# Patient Record
Sex: FEMALE | Race: WHITE | NOT HISPANIC OR LATINO | URBAN - NONMETROPOLITAN AREA
[De-identification: names, ages, dates, MRNs, and addresses within clinical notes are randomized per-mention and may not be internally consistent; named-entity substitution may affect disease eponyms.]

---

## 2019-05-08 ENCOUNTER — IMPORTED ENCOUNTER (OUTPATIENT)
Dept: URBAN - NONMETROPOLITAN AREA CLINIC 1 | Facility: CLINIC | Age: 64
End: 2019-05-08

## 2019-05-08 PROBLEM — H52.11: Noted: 2019-05-08

## 2019-05-08 PROBLEM — Z96.1: Noted: 2019-05-08

## 2019-05-08 PROBLEM — H04.123: Noted: 2019-05-08

## 2019-05-08 PROBLEM — Z01.01: Noted: 2019-05-08

## 2019-05-08 PROBLEM — H26.491: Noted: 2019-05-08

## 2019-05-08 PROCEDURE — 92014 COMPRE OPH EXAM EST PT 1/>: CPT

## 2019-05-08 PROCEDURE — 92015 DETERMINE REFRACTIVE STATE: CPT

## 2019-05-08 NOTE — PATIENT DISCUSSION
Myopia Presbyopia-Discussed diagnosis with patient. Updated spec Rx given. PC IOL OU w/  PCO OD-Explained symptoms of advancing PCO. -Continue to monitor for now. Pt will notify us if any new symptoms develop. RTC 1 Yr CEE; 's Notes: Dr. Bella Najjar

## 2020-06-18 ENCOUNTER — IMPORTED ENCOUNTER (OUTPATIENT)
Dept: URBAN - NONMETROPOLITAN AREA CLINIC 1 | Facility: CLINIC | Age: 65
End: 2020-06-18

## 2020-06-18 PROBLEM — H04.123: Noted: 2019-05-08

## 2020-06-18 PROBLEM — H26.491: Noted: 2019-05-08

## 2020-06-18 PROBLEM — Z96.1: Noted: 2019-05-08

## 2020-06-18 PROBLEM — H52.11: Noted: 2020-06-18

## 2020-06-18 PROCEDURE — 92015 DETERMINE REFRACTIVE STATE: CPT

## 2020-06-18 PROCEDURE — 92014 COMPRE OPH EXAM EST PT 1/>: CPT

## 2020-06-18 NOTE — PATIENT DISCUSSION
Myopia Presbyopia-Discussed diagnosis with patient. Updated spec Rx given. PC IOL OU w/  PCO OD-Explained symptoms of advancing PCO. -Continue to monitor for now. Pt will notify us if any new symptoms develop. RTC 1 Yr CEE; Dr's Notes: Dr. Damian Issa

## 2021-06-28 ENCOUNTER — IMPORTED ENCOUNTER (OUTPATIENT)
Dept: URBAN - NONMETROPOLITAN AREA CLINIC 1 | Facility: CLINIC | Age: 66
End: 2021-06-28

## 2021-06-28 PROBLEM — H26.491: Noted: 2021-06-28

## 2021-06-28 PROBLEM — H52.11: Noted: 2021-06-28

## 2021-06-28 PROBLEM — H52.4: Noted: 2021-06-28

## 2021-06-28 PROBLEM — Z96.1: Noted: 2021-06-28

## 2021-06-28 PROCEDURE — 92014 COMPRE OPH EXAM EST PT 1/>: CPT

## 2021-06-28 PROCEDURE — 92015 DETERMINE REFRACTIVE STATE: CPT

## 2021-06-28 NOTE — PATIENT DISCUSSION
Myopia Presbyopia-Discussed diagnosis with patient. Updated spec Rx given. PC IOL OU w/  PCO OD-Explained symptoms of advancing PCO. -Continue to monitor for now. Pt will notify us if any new symptoms develop. RTC 1 Yr CEE; Dr's Notes: Dr. Juan Luis Causey

## 2022-04-09 ASSESSMENT — TONOMETRY
OD_IOP_MMHG: 21
OS_IOP_MMHG: 16
OS_IOP_MMHG: 17
OD_IOP_MMHG: 16
OD_IOP_MMHG: 16
OS_IOP_MMHG: 18

## 2022-04-09 ASSESSMENT — VISUAL ACUITY
OD_PH: 20/30
OS_SC: 20/20
OS_CC: 20/20 DV
OS_CC: 20/20
OU_SC: 20/20
OD_CC: 20/30
OD_SC: 20/50

## 2022-06-29 ENCOUNTER — ESTABLISHED PATIENT (OUTPATIENT)
Dept: RURAL CLINIC 2 | Facility: CLINIC | Age: 67
End: 2022-06-29

## 2022-06-29 DIAGNOSIS — H52.4: ICD-10-CM

## 2022-06-29 DIAGNOSIS — H26.491: ICD-10-CM

## 2022-06-29 DIAGNOSIS — H43.813: ICD-10-CM

## 2022-06-29 DIAGNOSIS — Z96.1: ICD-10-CM

## 2022-06-29 PROCEDURE — 92015 DETERMINE REFRACTIVE STATE: CPT

## 2022-06-29 PROCEDURE — 92014 COMPRE OPH EXAM EST PT 1/>: CPT

## 2022-06-29 ASSESSMENT — TONOMETRY
OS_IOP_MMHG: 20
OD_IOP_MMHG: 17

## 2022-06-29 ASSESSMENT — VISUAL ACUITY
OS_SC: 20/20
OD_SC: 20/50

## 2023-09-12 ENCOUNTER — ESTABLISHED PATIENT (OUTPATIENT)
Dept: RURAL CLINIC 2 | Facility: CLINIC | Age: 68
End: 2023-09-12

## 2023-09-12 DIAGNOSIS — Z96.1: ICD-10-CM

## 2023-09-12 DIAGNOSIS — H52.13: ICD-10-CM

## 2023-09-12 DIAGNOSIS — H43.813: ICD-10-CM

## 2023-09-12 DIAGNOSIS — H52.223: ICD-10-CM

## 2023-09-12 DIAGNOSIS — H52.4: ICD-10-CM

## 2023-09-12 PROCEDURE — 92015 DETERMINE REFRACTIVE STATE: CPT

## 2023-09-12 PROCEDURE — 92014 COMPRE OPH EXAM EST PT 1/>: CPT

## 2023-09-12 ASSESSMENT — TONOMETRY
OD_IOP_MMHG: 17
OS_IOP_MMHG: 18

## 2023-09-12 ASSESSMENT — VISUAL ACUITY
OS_SC: 20/20
OD_SC: 20/50+

## 2025-01-24 ENCOUNTER — COMPREHENSIVE EXAM (OUTPATIENT)
Age: 70
End: 2025-01-24

## 2025-01-24 DIAGNOSIS — H52.4: ICD-10-CM

## 2025-01-24 DIAGNOSIS — H52.13: ICD-10-CM

## 2025-01-24 DIAGNOSIS — Z96.1: ICD-10-CM

## 2025-01-24 DIAGNOSIS — H52.223: ICD-10-CM

## 2025-01-24 DIAGNOSIS — H43.813: ICD-10-CM

## 2025-01-24 PROCEDURE — 92014 COMPRE OPH EXAM EST PT 1/>: CPT

## 2025-01-24 PROCEDURE — 92015 DETERMINE REFRACTIVE STATE: CPT
